# Patient Record
Sex: MALE | Race: WHITE | Employment: FULL TIME | URBAN - NONMETROPOLITAN AREA
[De-identification: names, ages, dates, MRNs, and addresses within clinical notes are randomized per-mention and may not be internally consistent; named-entity substitution may affect disease eponyms.]

---

## 2023-09-07 ENCOUNTER — HOSPITAL ENCOUNTER (OUTPATIENT)
Age: 23
Discharge: HOME | End: 2023-09-07

## 2023-09-07 ENCOUNTER — HOSPITAL ENCOUNTER (OUTPATIENT)
Age: 23
Discharge: HOME | End: 2023-09-09

## 2023-09-07 LAB
EKG ATRIAL RATE: 47 BPM
EKG P AXIS: 18 DEGREES
EKG P-R INTERVAL: 142 MS
EKG Q-T INTERVAL: 426 MS
EKG QRS DURATION: 94 MS
EKG QTC CALCULATION (BAZETT): 377 MS
EKG R AXIS: 44 DEGREES
EKG T AXIS: 47 DEGREES
EKG VENTRICULAR RATE: 47 BPM

## 2023-09-29 ENCOUNTER — HOSPITAL ENCOUNTER (OUTPATIENT)
Age: 23
Discharge: HOME | End: 2023-09-29
Payer: COMMERCIAL

## 2023-09-29 LAB
ALBUMIN SERPL-MCNC: 4.7 G/DL (ref 3.5–5.2)
ALP SERPL-CCNC: 60 U/L (ref 40–129)
ALT SERPL-CCNC: 26 U/L (ref 5–41)
ANION GAP SERPL CALCULATED.3IONS-SCNC: 9 MMOL/L (ref 9–17)
AST SERPL-CCNC: 26 U/L
BILIRUB SERPL-MCNC: 0.6 MG/DL (ref 0.3–1.2)
BUN SERPL-MCNC: 13 MG/DL (ref 6–20)
BUN/CREAT SERPL: 12 (ref 9–20)
CALCIUM SERPL-MCNC: 9.6 MG/DL (ref 8.6–10.4)
CHLORIDE SERPL-SCNC: 101 MMOL/L (ref 98–107)
CHOLEST SERPL-MCNC: 130 MG/DL
CHOLESTEROL/HDL RATIO: 3
CO2 SERPL-SCNC: 27 MMOL/L (ref 20–31)
CREAT SERPL-MCNC: 1.1 MG/DL (ref 0.7–1.2)
ERYTHROCYTE [DISTWIDTH] IN BLOOD BY AUTOMATED COUNT: 11.3 % (ref 12.1–15.2)
GFR SERPL CREATININE-BSD FRML MDRD: >60 ML/MIN/1.73M2
GLUCOSE SERPL-MCNC: 87 MG/DL (ref 70–99)
HCT VFR BLD AUTO: 44.2 % (ref 41–53)
HDLC SERPL-MCNC: 43 MG/DL
HGB BLD-MCNC: 16 G/DL (ref 13.5–17.5)
LDLC SERPL CALC-MCNC: 68 MG/DL (ref 0–130)
MCH RBC QN AUTO: 29.9 PG (ref 26–34)
MCHC RBC AUTO-ENTMCNC: 36.2 G/DL (ref 31–37)
MCV RBC AUTO: 82.6 FL (ref 80–100)
PLATELET # BLD AUTO: 197 K/UL (ref 140–450)
PMV BLD AUTO: 9.2 FL (ref 6–12)
POTASSIUM SERPL-SCNC: 3.8 MMOL/L (ref 3.7–5.3)
PROT SERPL-MCNC: 6.8 G/DL (ref 6.4–8.3)
RBC # BLD AUTO: 5.35 M/UL (ref 4.5–5.9)
SODIUM SERPL-SCNC: 137 MMOL/L (ref 135–144)
TRIGL SERPL-MCNC: 94 MG/DL
WBC OTHER # BLD: 5.5 K/UL (ref 4.5–13.5)

## 2023-09-29 PROCEDURE — 36415 COLL VENOUS BLD VENIPUNCTURE: CPT

## 2023-09-29 PROCEDURE — 80061 LIPID PANEL: CPT

## 2023-09-29 PROCEDURE — 82525 ASSAY OF COPPER: CPT

## 2023-09-29 PROCEDURE — 83825 ASSAY OF MERCURY: CPT

## 2023-09-29 PROCEDURE — 82300 ASSAY OF CADMIUM: CPT

## 2023-09-29 PROCEDURE — 83655 ASSAY OF LEAD: CPT

## 2023-09-29 PROCEDURE — 85027 COMPLETE CBC AUTOMATED: CPT

## 2023-09-29 PROCEDURE — 84630 ASSAY OF ZINC: CPT

## 2023-09-29 PROCEDURE — 80053 COMPREHEN METABOLIC PANEL: CPT

## 2023-09-29 PROCEDURE — 82175 ASSAY OF ARSENIC: CPT

## 2023-10-04 ENCOUNTER — TRANSCRIBE ORDERS (OUTPATIENT)
Dept: ADMINISTRATIVE | Age: 23
End: 2023-10-04

## 2023-10-04 DIAGNOSIS — Z02.1 PRE-EMPLOYMENT HEALTH SCREENING EXAMINATION: Primary | ICD-10-CM

## 2023-10-05 LAB
ARSENIC 24H UR-MCNC: <10 UG/L (ref 0–34.9)
ARSENIC 24H UR-MRATE: ABNORMAL UG/D (ref 0–49.9)
ARSENIC/CREAT 24H UR: ABNORMAL UG/G CRT (ref 0–29.9)
CADMIUM, URINE /24 HR: ABNORMAL UG/D (ref 0–3.2)
CADMIUM, URINE /VOLUME: <1 UG/L (ref 0–1)
CADMIUM, URINE RATIO CREATININE: ABNORMAL UG/G CRT (ref 0–3.2)
COLLECT DURATION TIME SPEC: ABNORMAL H
COPPER, URINE /24 HR: ABNORMAL UG/D (ref 3–45)
COPPER, URINE /VOLUME: 1.1 UG/DL
COPPER, URINE RATIO CREATININE: 5.2 UG/G CRT (ref 10–45)
CREAT 24H UR-MCNC: 212 MG/DL
CREATININE URINE /24 HR: ABNORMAL MG/D (ref 1000–2500)
LEAD 24H UR-MCNC: <5 UG/L (ref 0–5)
LEAD 24H UR-MRATE: ABNORMAL UG/D (ref 0–8.1)
LEAD/CREAT 24H UR: ABNORMAL UG/G CRT (ref 0–5)
MERCURY 24H UR-MCNC: <2.5 UG/L (ref 0–5)
MERCURY 24H UR-MRATE: ABNORMAL UG/D (ref 0–20)
MERCURY/CREAT 24H UR: ABNORMAL UG/G CRT (ref 0–20)
SPECIMEN VOL ?TM UR: 60 ML
ZINC, URINE /24 HR: ABNORMAL UG/D (ref 150–1200)
ZINC, URINE /VOLUME: 122.9 UG/DL (ref 15–120)
ZINC, URINE RATIO CREATININE: 579.7 UG/G CRT (ref 110–750)

## 2023-10-09 ENCOUNTER — HOSPITAL ENCOUNTER (OUTPATIENT)
Age: 23
Discharge: HOME | End: 2023-10-11

## 2023-10-09 DIAGNOSIS — Z02.1 PRE-EMPLOYMENT HEALTH SCREENING EXAMINATION: ICD-10-CM

## 2023-10-09 PROCEDURE — 93017 CV STRESS TEST TRACING ONLY: CPT

## 2023-10-10 LAB
STRESS ESTIMATED WORKLOAD: 17 METS
STRESS EXERCISE DUR MIN: 10 MIN
STRESS TARGET HR: 199 BPM

## 2023-10-10 PROCEDURE — 93018 CV STRESS TEST I&R ONLY: CPT | Performed by: INTERNAL MEDICINE

## 2023-10-10 PROCEDURE — 93016 CV STRESS TEST SUPVJ ONLY: CPT | Performed by: INTERNAL MEDICINE

## 2023-10-16 ENCOUNTER — HOSPITAL ENCOUNTER (OUTPATIENT)
Dept: PULMONOLOGY | Age: 23
Discharge: HOME | End: 2023-10-16

## 2023-10-16 ENCOUNTER — HOSPITAL ENCOUNTER (OUTPATIENT)
Age: 23
Discharge: HOME | End: 2023-10-18

## 2023-10-16 ENCOUNTER — HOSPITAL ENCOUNTER (OUTPATIENT)
Dept: GENERAL RADIOLOGY | Age: 23
Discharge: HOME | End: 2023-10-18

## 2023-10-16 DIAGNOSIS — Z01.818 PREOP EXAMINATION: ICD-10-CM

## 2023-10-16 DIAGNOSIS — Z02.1 PRE-EMPLOYMENT EXAMINATION: ICD-10-CM

## 2023-10-16 PROCEDURE — 94010 BREATHING CAPACITY TEST: CPT

## 2023-10-16 PROCEDURE — 71046 X-RAY EXAM CHEST 2 VIEWS: CPT

## 2023-10-17 PROCEDURE — 94375 RESPIRATORY FLOW VOLUME LOOP: CPT | Performed by: INTERNAL MEDICINE

## 2023-10-17 NOTE — PROCEDURES
[address] [place] [address],[address]                            [facility]                               PULMONARY FUNCTION    PATIENT NAME: Nayan SHIPMAN                 :        2000  MED REC NO:   [id number]                              ROOM:  ACCOUNT NO:   [de-identified]                           ADMIT DATE: 10/16/2023  PROVIDER:     Annie Baum MD    DATE OF PROCEDURE:  10/16/2023    PULMONARY FUNCTION TESTS    ATTENDING PHYSICIAN:  Carole Conway, nurse practitioner. REASON FOR TEST:  Pre-employment examination. SUMMARY:  1. The patient's effort was reported as good. 2.  Forced vital capacity is normal at 88% of predicted. 3.  The forced exhaled volume in 1 second is normal at 89% of predicted. 4.  The forced exhaled volume in 1 second/forced vital capacity is  normal at 100% of predicted. 5.  The forced exhaled flow at 25-75% is normal at 93% of predicted. IMPRESSION:  Normal pulmonary function tests and flow volume loop.         Annie Baum MD    D: 10/17/2023 6:46:50       T: 10/17/2023 10:08:49     [id number]  Job#: [id number]     Doc#: [id number]    CC:  Carole Conway